# Patient Record
Sex: MALE | Race: WHITE | NOT HISPANIC OR LATINO | URBAN - METROPOLITAN AREA
[De-identification: names, ages, dates, MRNs, and addresses within clinical notes are randomized per-mention and may not be internally consistent; named-entity substitution may affect disease eponyms.]

---

## 2023-03-12 ENCOUNTER — EMERGENCY (EMERGENCY)
Facility: HOSPITAL | Age: 28
LOS: 0 days | Discharge: HOME | End: 2023-03-13
Attending: EMERGENCY MEDICINE
Payer: SELF-PAY

## 2023-03-12 VITALS
HEART RATE: 70 BPM | RESPIRATION RATE: 18 BRPM | TEMPERATURE: 97 F | DIASTOLIC BLOOD PRESSURE: 71 MMHG | OXYGEN SATURATION: 98 % | SYSTOLIC BLOOD PRESSURE: 128 MMHG
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
## 2023-03-12 DIAGNOSIS — M25.561 PAIN IN RIGHT KNEE: ICD-10-CM
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
## 2023-03-12 PROCEDURE — 99283 EMERGENCY DEPT VISIT LOW MDM: CPT

## 2023-03-12 PROCEDURE — 99284 EMERGENCY DEPT VISIT MOD MDM: CPT

## 2023-03-12 PROCEDURE — 99053 MED SERV 10PM-8AM 24 HR FAC: CPT

## 2023-03-12 PROCEDURE — 73564 X-RAY EXAM KNEE 4 OR MORE: CPT | Mod: 26,RT

## 2023-03-12 PROCEDURE — 73564 X-RAY EXAM KNEE 4 OR MORE: CPT | Mod: RT

## 2023-03-12 NOTE — ED PROVIDER NOTE - PROGRESS NOTE DETAILS
KA - Pt informed of results, offered knee immobilizer, and orthopedic follow up. Pt verbalized understanding of no acute findings on xray at this time. Pt discharged but refusing to leave the ED. Pt using profanities directed at provider staff. Security called. GW: I personally evaluated the patient. I reviewed the Physician Assistant Fellow's note and agree with the findings and plan.

## 2023-03-12 NOTE — ED PROVIDER NOTE - PHYSICAL EXAMINATION
As Follows:  CONST: Well appearing in NAD.   EYES: EOMI, Sclera and conjunctiva clear.   MS: Mildly tender to medial right knee. No ecchymosis or signs of injury/trauma. Correctly placed patella in knee. Normal ROM in all extremities.   SKIN: Warm, dry, no acute rashes. Good turgor  NEURO: A&Ox3, No focal deficits. Strength 5/5 with no sensory deficits. Steady gait  VASC: DP pulses 2+, PT pulses 2+. Cap refill <2sec.

## 2023-03-12 NOTE — ED PROVIDER NOTE - PATIENT PORTAL LINK FT
You can access the FollowMyHealth Patient Portal offered by [facility] by registering at the following website: [email or web address]. By joining [email or web address]’s FollowMyHealth portal, you will also be able to view your health information using other applications (apps) compatible with our system.

## 2023-03-12 NOTE — ED PROVIDER NOTE - NS ED ATTENDING STATEMENT MOD
This was a shared visit with the [facility]. I reviewed and verified the documentation and independently performed the documented:

## 2023-03-12 NOTE — ED PROVIDER NOTE - OBJECTIVE STATEMENT
Pt is a 26 y/o male with no PMHx presents for right knee pain after a patellar dislocation that occurred 1 week ago. Pt states he has had multiple right patellar dislocations which always self reduce. He presents today because he has had continued pain to his right knee. He denies any injuries or trauma. He denies any other complaints of fever, chills, cough, sore throat, N/V/D/C, abdominal pain, CP, SoB, or urinary complaints.

## 2023-03-12 NOTE — ED PROVIDER NOTE - CLINICAL SUMMARY MEDICAL DECISION MAKING FREE TEXT BOX
x ray obtained and interpreted by me. no acute fx.  Pt offered knee immobilizer but refused.  Became agitated and yelling at staff. will need outpt follow up

## 2023-03-12 NOTE — ED PROVIDER NOTE - ATTENDING APP SHARED VISIT CONTRIBUTION OF CARE
28 yo M presents with rt knee pain.  Pt with h/o patellar dislocations in the past.  Last was 1 week ago.  Pain persists. Pt concerned for fracture. On exam pt in NAD AAO x 3, seen ambulate, minor swelling rt knee with tenderness,
